# Patient Record
Sex: MALE | Race: WHITE | URBAN - METROPOLITAN AREA
[De-identification: names, ages, dates, MRNs, and addresses within clinical notes are randomized per-mention and may not be internally consistent; named-entity substitution may affect disease eponyms.]

---

## 2024-07-09 ENCOUNTER — OFFICE VISIT (OUTPATIENT)
Age: 39
End: 2024-07-09

## 2024-07-09 VITALS
OXYGEN SATURATION: 95 % | HEIGHT: 74 IN | SYSTOLIC BLOOD PRESSURE: 104 MMHG | HEART RATE: 73 BPM | TEMPERATURE: 97.8 F | WEIGHT: 315 LBS | RESPIRATION RATE: 15 BRPM | BODY MASS INDEX: 40.43 KG/M2 | DIASTOLIC BLOOD PRESSURE: 72 MMHG

## 2024-07-09 DIAGNOSIS — H92.02 LEFT EAR PAIN: ICD-10-CM

## 2024-07-09 DIAGNOSIS — J02.9 PHARYNGITIS, UNSPECIFIED ETIOLOGY: ICD-10-CM

## 2024-07-09 DIAGNOSIS — J01.40 ACUTE NON-RECURRENT PANSINUSITIS: Primary | ICD-10-CM

## 2024-07-09 RX ORDER — EMTRICITABINE AND TENOFOVIR DISOPROXIL FUMARATE 200; 300 MG/1; MG/1
1 TABLET, FILM COATED ORAL DAILY
COMMUNITY
Start: 2023-12-21

## 2024-07-09 RX ORDER — AZITHROMYCIN 250 MG/1
TABLET, FILM COATED ORAL
Qty: 6 TABLET | Refills: 0 | Status: SHIPPED | OUTPATIENT
Start: 2024-07-09 | End: 2024-07-19

## 2024-07-09 RX ORDER — PREDNISONE 20 MG/1
20 TABLET ORAL 2 TIMES DAILY
Qty: 10 TABLET | Refills: 0 | Status: SHIPPED | OUTPATIENT
Start: 2024-07-09 | End: 2024-07-14

## 2024-07-09 ASSESSMENT — ENCOUNTER SYMPTOMS
VOICE CHANGE: 0
FACIAL SWELLING: 0
SHORTNESS OF BREATH: 0
WHEEZING: 0
VOMITING: 0
RHINORRHEA: 1
CHEST TIGHTNESS: 0
EYE DISCHARGE: 0
HEARTBURN: 0
SINUS PRESSURE: 1
NAUSEA: 0
COUGH: 1
DIARRHEA: 0
SORE THROAT: 1
TROUBLE SWALLOWING: 0
SINUS PAIN: 1

## 2024-07-09 NOTE — PROGRESS NOTES
[facility] URGENT CARE, [facility] ([place])  [facility] URGENT CARE  [address] [facility] [address]  Dept: [phone]  Dept Fax: [phone]  Loc: [phone]  Ryan Zaragoza is a 38 y.o. male who presents today for his medical conditions/complaintsas noted below.  Ryan Zaragoza is c/o of Cough (Cough , chest congestion , st , facial pressure and drainage x 5 days )      HPI:     Had cold symptoms two weeks ago. Got better for two days and then return 4 days ago with ear pain, sinus pain and pressure (worse on left side) and sore throat on and off.  No  known exposure to covid. Non productive cough. Chill started yesterday but no fever.       History provided by:  Patient  Cough  This is a new problem. The current episode started in the past 7 days. The problem has been rapidly worsening. The cough is Non-productive. Associated symptoms include chills, ear congestion, ear pain, nasal congestion, postnasal drip, rhinorrhea and a sore throat. Pertinent negatives include no chest pain, fever, headaches, heartburn, myalgias, rash, shortness of breath or wheezing. The symptoms are aggravated by lying down. He has tried rest and OTC cough suppressant for the symptoms. The treatment provided no relief.       Past Medical History:   Diagnosis Date    Anemia     Cerebral palsy (HCC)     GERD (gastroesophageal reflux disease)     Kyphoscoliosis     Seizures (HCC)     UTI (lower urinary tract infection)       Past Surgical History:   Procedure Laterality Date    GASTROSTOMY TUBE CHANGE      SALIVARY GLAND SURGERY      SPINAL FUSION         Family History   Problem Relation Age of Onset    Cancer Maternal Grandmother     Cancer Maternal Grandfather        Social History     Tobacco Use    Smoking status: Never    Smokeless tobacco: Not on file   Substance Use Topics    Alcohol use: No      Current Outpatient Medications   Medication Sig Dispense Refill    emtricitabine-tenofovir (TRUVADA) 200-300 MG per  Chronic, stable on 2L home O2  - wheezing noted, will consider addition of steroid course if persistent or worsening resp status  - continue nebs

## 2024-07-09 NOTE — PATIENT INSTRUCTIONS
Take medications as directed    May alternate tylenol  and ibuprofen for pain    Return to clinic or go to the ER if symptoms does not improve or worsen    Follow up with your primary care provider

## 2024-12-28 ENCOUNTER — OFFICE VISIT (OUTPATIENT)
Age: 39
End: 2024-12-28

## 2024-12-28 VITALS
SYSTOLIC BLOOD PRESSURE: 128 MMHG | TEMPERATURE: 96.6 F | RESPIRATION RATE: 16 BRPM | HEIGHT: 74 IN | WEIGHT: 315 LBS | DIASTOLIC BLOOD PRESSURE: 80 MMHG | BODY MASS INDEX: 40.43 KG/M2 | HEART RATE: 105 BPM | OXYGEN SATURATION: 95 %

## 2024-12-28 DIAGNOSIS — J20.9 ACUTE BRONCHITIS, UNSPECIFIED ORGANISM: Primary | ICD-10-CM

## 2024-12-28 RX ORDER — PREDNISONE 20 MG/1
40 TABLET ORAL DAILY
Qty: 10 TABLET | Refills: 0 | Status: SHIPPED | OUTPATIENT
Start: 2024-12-28 | End: 2025-01-02

## 2024-12-28 RX ORDER — DEXTROMETHORPHAN HYDROBROMIDE AND PROMETHAZINE HYDROCHLORIDE 15; 6.25 MG/5ML; MG/5ML
5 SYRUP ORAL 4 TIMES DAILY PRN
Qty: 120 ML | Refills: 0 | Status: SHIPPED | OUTPATIENT
Start: 2024-12-28 | End: 2025-01-04

## 2024-12-28 RX ORDER — AZITHROMYCIN 250 MG/1
250 TABLET, FILM COATED ORAL SEE ADMIN INSTRUCTIONS
Qty: 6 TABLET | Refills: 0 | Status: SHIPPED | OUTPATIENT
Start: 2024-12-28 | End: 2025-01-02

## 2024-12-28 ASSESSMENT — ENCOUNTER SYMPTOMS
COUGH: 1
HEMOPTYSIS: 0
SORE THROAT: 0
WHEEZING: 1
HEARTBURN: 0
SHORTNESS OF BREATH: 0
RHINORRHEA: 0

## 2024-12-28 NOTE — PROGRESS NOTES
Ryan Zaragoza (:  1985) is a 39 y.o. male,Established patient, here for evaluation of the following chief complaint(s):  Cough (Patient c/o cough, congestion and headaches x7 days)      ASSESSMENT/PLAN:  1. Acute bronchitis, unspecified organism    - azithromycin (ZITHROMAX) 250 MG tablet; Take 1 tablet by mouth See Admin Instructions for 5 days 500mg on day 1 followed by 250mg on days 2 - 5  Dispense: 6 tablet; Refill: 0  - predniSONE (DELTASONE) 20 MG tablet; Take 2 tablets by mouth daily for 5 days  Dispense: 10 tablet; Refill: 0  - promethazine-dextromethorphan (PROMETHAZINE-DM) 6.25-15 MG/5ML syrup; Take 5 mLs by mouth 4 times daily as needed for Cough  Dispense: 120 mL; Refill: 0     -instruction in AVS  Return if symptoms worsen or fail to improve.    SUBJECTIVE/OBJECTIVE:    History provided by:  Patient  Cough  This is a new problem. The current episode started in the past 7 days. The problem has been gradually worsening. The cough is Productive of sputum. Associated symptoms include nasal congestion and wheezing. Pertinent negatives include no chest pain, chills, ear pain, fever, headaches, heartburn, hemoptysis, myalgias, rash, rhinorrhea, sore throat, shortness of breath or sweats.       Vitals:    24 1541 24 1548   BP: 128/86 128/80   Site: Right Upper Arm    Position: Sitting    Cuff Size: Large Adult    Pulse: (!) 105    Resp:  16   Temp: (!) 96.6 °F (35.9 °C)    TempSrc: Temporal    SpO2: 95%    Weight: (!) 163.3 kg (360 lb)    Height: 1.88 m (6' 2\")        Review of Systems   Constitutional:  Negative for chills and fever.   HENT:  Negative for ear pain, rhinorrhea and sore throat.    Respiratory:  Positive for cough and wheezing. Negative for hemoptysis and shortness of breath.    Cardiovascular:  Negative for chest pain.   Gastrointestinal:  Negative for heartburn.   Musculoskeletal:  Negative for myalgias.   Skin:  Negative for rash.   Neurological:  Negative for headaches.